# Patient Record
Sex: FEMALE | Race: WHITE
[De-identification: names, ages, dates, MRNs, and addresses within clinical notes are randomized per-mention and may not be internally consistent; named-entity substitution may affect disease eponyms.]

---

## 2017-06-07 ENCOUNTER — HOSPITAL ENCOUNTER (EMERGENCY)
Dept: HOSPITAL 44 - ED | Age: 37
Discharge: HOME | End: 2017-06-07
Payer: SELF-PAY

## 2017-06-07 VITALS — SYSTOLIC BLOOD PRESSURE: 125 MMHG | DIASTOLIC BLOOD PRESSURE: 75 MMHG

## 2017-06-07 DIAGNOSIS — L03.313: Primary | ICD-10-CM

## 2017-06-07 PROCEDURE — 99283 EMERGENCY DEPT VISIT LOW MDM: CPT

## 2017-06-07 PROCEDURE — A9270 NON-COVERED ITEM OR SERVICE: HCPCS

## 2017-06-07 NOTE — ED PHYSICIAN DOCUMENTATION
Abscess





- HISTORIAN


Historian: patient





- HPI


Chief Complaint: Skin Rash


Additional Information: 





Pt is a 35 yo female that presents with right breast redness, swelling and pain 

since Monday. Pt reports she was getting in the shower Monday night and noticed 

a painful red area under her right breast. States after the shower her  

was able to express a little purulent drainage but has not had any since then. 

States she noticed the redness and swelling has increased since then. Denies 

history of MRSA. Denies fever/chills.


Timing: worse


Location: other (under right breast)


Quality: painful





- ROS


CONST: none


CVS/RESP: none


EYES/ENT: none


GI/: none


MS/SKIN/LYMPH: rash, other (Redness and swelling under right breast)


NEURO/PSYCH: none





- PAST HX


Past History: none


Allergies/Adverse Reactions: 


 Allergies











Allergy/AdvReac Type Severity Reaction Status Date / Time


 


No Known Allergies Allergy   Verified 06/23/15 22:19











Home Medications: 


 Ambulatory Orders











 Medication  Instructions  Recorded


 


NK [NK]  02/10/15














- SOCIAL HX


Smoking History: non-smoker





- FAMILY HX


Family History: none





- VITAL SIGNS


Vital Signs: 





 Vital Signs











Temp Pulse Resp BP Pulse Ox


 


          118/82    


 


          06/23/15 22:52   














- REVIEWED ASSESSMENTS


Nursing Assessment  Reviewed: Yes


Vitals Reviewed: Yes





Abscess Physical Exam





- EXAM


General Appearance: no acute distress


Skin: tender indurated area, erythema, other (Right breast - under right breast 

is an area of redness and warmth with a centralized indurated area roughly the 

size of a golf ball. No fluctuance is appreciated. Area is TTP.).  No: pointing 

fluctuant with erythema


Symptoms: warmth, tenderness


Extremities: nml ROM


Respiratory: no resp distress, breath sounds normal


CVS: reg. rate & rhythm, heart sounds nml


Abdomen: non-tender


Neuro/Psych: oriented x3





Discharge


Clincal Impression: 


Cellulitis


Qualifiers:


 Site of cellulitis: trunk Site of cellulitis of trunk: chest wall Qualified 

Code(s): L03.313 - Cellulitis of chest wall





Referrals: 


Karin Ta MD [Primary Care Provider] - 2 Days


Additional Instructions: 


Take medications as prescribed.


May take Ibuprofen for pain and inflammation relief.


May use warm compresses to the area as well.


Call tomorrow for follow up with your PCP in 3-5 days.


Return to the ED should your symptoms worsen or not improve.


Home Medications: 


Ambulatory Orders





NK [NK]  02/10/15 








Condition: Good


Disposition: 01 HOME, SELF-CARE


Decision to Admit: NO


Decision Time: 20:43

## 2019-04-07 ENCOUNTER — HOSPITAL ENCOUNTER (EMERGENCY)
Dept: HOSPITAL 44 - ED | Age: 39
Discharge: HOME | End: 2019-04-07
Payer: SELF-PAY

## 2019-04-07 VITALS — SYSTOLIC BLOOD PRESSURE: 118 MMHG | DIASTOLIC BLOOD PRESSURE: 74 MMHG

## 2019-04-07 DIAGNOSIS — M72.2: Primary | ICD-10-CM

## 2019-04-07 DIAGNOSIS — M79.672: ICD-10-CM

## 2019-04-07 PROCEDURE — 99283 EMERGENCY DEPT VISIT LOW MDM: CPT

## 2019-04-07 PROCEDURE — 29540 STRAPPING ANKLE &/FOOT: CPT

## 2019-04-07 PROCEDURE — 73630 X-RAY EXAM OF FOOT: CPT

## 2019-04-07 NOTE — DIAGNOSTIC IMAGING REPORT
NURIA TINEO 

[facility]

[address] [facility] [address] [address]

 

 

 

 

Report Submission Date: 2019 11:59:28 AM CDT

Patient       Study

Name:   EDDIE BEAN       Date:   2019 11:39:01 AM CDT

MRN:   [id number]       Modality Type:   DX

Gender:   F       Description:   FOOT 3 VIEWS OR MORE

:   82       Institution:   [facility]

Physician:   NURIA TINEO

     Accession:    [id number]

 

 

Left foot, three views. 



History: BUMP ON PLANTAR PORTION OF LEFT FOOT X 2 WEEKS. FELT A POP 3 DAYS AGO. 
NO PREVIOUS INJURY OR SX TO LEFT FOOT. PATIENT DENIES CHANCE OF PREGNANCY AND 
WAS SHIELDED FOR EXAM. 



Findings: The osseous structures are intact without acute fracture. The joint 
space and alignment are normal. There is no soft tissue swelling. 



Impression: 



1. No acute osseous abnormality.

 

Electronically signed on 2019 11:59:28 AM CDT by:

Dino VARELA

## 2019-04-07 NOTE — ED PHYSICIAN DOCUMENTATION
Lower Extremity Problem





- HISTORIAN


Historian: patient





- HPI


Stated Complaint: L foot pain


Chief Complaint: Lower Extremity Problem (Left Foot Pain)


Additional Information: 


Patient is a 38-year-old female who presents to the ER with c/o left foot pain 

(? plantar faciaiitis).  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 

days in a row.  She does have a callous next to the heel of the foot (nontender)

but has tenderness/swelling to the arch of the foot.  Pain is mostly when she 

bears weight. 


Location of Injury: L foot


Onset: days ago (Finished 3  12hr shifts- standing)


Timing: still present, other (worse when bearing weight)


Duration: constant


Recent Injury: No


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

12 hours a day)


Where: work


Severity: moderate


Quality: pain, swelling, tenderness


Exacerbated By: walking


Relieved By: rest


Associated Symptoms: denies: chest pain, shortness of breath


Further Comments: no





- ROS


CONST: no problems


MS/SKIN/LYMPH: denies: calf pain, leg pain, ankle swelling


CVS/RESP: none


GI/: none


EYES/ENT: none





- PAST HX


Past History: none


PE Risk Factors: none


Surgeries/Procedures: appendectomy, , other (sinus, RTC)


Allergies/Adverse Reactions: 


                                    Allergies











Allergy/AdvReac Type Severity Reaction Status Date / Time


 


No Known Allergies Allergy   Verified 19 11:41














Home Medications: 


                                Ambulatory Orders











 Medication  Instructions  Recorded


 


NK  02/10/15














- SOCIAL HX


Smoking History: less than 1 pack/day


Alcohol Use: none


Drug Use: none





- FAMILY HX


Family History: none





- VITAL SIGNS


Vital Signs: 


                                   Vital Signs











Temp Pulse Resp BP Pulse Ox


 


 98.3 F   77   16   118/74   99 


 


 19 11:36  19 11:36  19 11:36  19 11:36  19 11:36














ED Results Lab/Radiology





- Radiology


Radiology Impressions: 


Left foot, three views. 





History: BUMP ON PLANTAR PORTION OF LEFT FOOT X 2 WEEKS. FELT A POP 3 DAYS AGO. 

NO PREVIOUS INJURY OR SX TO LEFT FOOT. PATIENT DENIES CHANCE OF PREGNANCY AND 

WAS SHIELDED FOR EXAM. 





Findings: The osseous structures are intact without acute fracture. The joint 

space and alignment are normal. There is no soft tissue swelling. 





Impression: 





1. No acute osseous abnormality.





Electronically signed on 2019 11:59:28 AM CDT by:


Dino Hicks





- Orders


Orders: 


                                    ED Orders











 Category Date Time Status


 


 Ace Wrap Affected Extremity 1T Care  19 12:03 Ordered


 


 FOOT 3 VIEWS OR MORE [RAD] Stat Exams  19 Taken














Lower Extremity Problem





- EXAM


General Appearance: mild distress


Hips: bilateral hip: non-tender, normal range of motion


Legs: bilateral: non-tender


Knees: bilateral: non-tender


Ankle: bilateral: non-tender


Foot: left foot: pain, soft tissue tenderness, swelling


Neuro/Tendon: normal sensation, normal motor functions, normal tendon functions


EENT: eye inspection normal, ENT inspection normal, pharynx normal, MED


RESPIRATORY: no resp distress, breath sounds normal


CVS: heart sounds normal, equal pulses


JOINT: nml ROM, Nml gait/weight bearing


VASCULAR: pulses full/equal


NEURO/PSYCH: oriented X3, motor nml, sensation nml, mood/affect nml, cognition 

normal


SKIN: warm/dry, normal color





Discharge


Clincal Impression: 


 Plantar fasciitis of left foot, Foot pain, left





Referrals: 


Karin Ta MD [Primary Care Provider] - 2 Days


Additional Instructions: 


Wear ace wrap for support


Call clinic and see about follow up with Dr. Grimes





Condition: Good


Disposition: 01 HOME, SELF-CARE


Decision to Admit: NO


Decision Time: 12:12